# Patient Record
Sex: MALE | Race: WHITE | Employment: OTHER | URBAN - METROPOLITAN AREA
[De-identification: names, ages, dates, MRNs, and addresses within clinical notes are randomized per-mention and may not be internally consistent; named-entity substitution may affect disease eponyms.]

---

## 2017-08-02 ENCOUNTER — HOSPITAL ENCOUNTER (EMERGENCY)
Facility: CLINIC | Age: 67
Discharge: HOME | End: 2017-08-02
Attending: EMERGENCY MEDICINE
Payer: MEDICARE

## 2017-08-02 ENCOUNTER — APPOINTMENT (OUTPATIENT)
Dept: GENERAL RADIOLOGY | Facility: CLINIC | Age: 67
End: 2017-08-02
Payer: MEDICARE

## 2017-08-02 VITALS
HEART RATE: 80 BPM | HEIGHT: 67 IN | DIASTOLIC BLOOD PRESSURE: 98 MMHG | SYSTOLIC BLOOD PRESSURE: 167 MMHG | OXYGEN SATURATION: 98 % | TEMPERATURE: 97.9 F | BODY MASS INDEX: 27.94 KG/M2 | WEIGHT: 178 LBS

## 2017-08-02 DIAGNOSIS — S60.221A CONTUSION OF RIGHT HAND, INITIAL ENCOUNTER: Primary | ICD-10-CM

## 2017-08-02 PROCEDURE — 73130 X-RAY EXAM OF HAND: CPT

## 2017-08-02 PROCEDURE — 99283 EMERGENCY DEPT VISIT LOW MDM: CPT

## 2017-08-02 ASSESSMENT — PAIN SCALES - GENERAL: PAINLEVEL_OUTOF10: 4

## 2017-08-02 ASSESSMENT — ENCOUNTER SYMPTOMS
ABDOMINAL PAIN: 0
COUGH: 0
RHINORRHEA: 0
SHORTNESS OF BREATH: 0
VOMITING: 0
DIARRHEA: 0
COLOR CHANGE: 0
VOICE CHANGE: 0
NAUSEA: 0
BACK PAIN: 0
TROUBLE SWALLOWING: 0

## 2017-08-02 ASSESSMENT — PAIN DESCRIPTION - LOCATION: LOCATION: HAND

## 2017-08-02 ASSESSMENT — PAIN DESCRIPTION - PAIN TYPE: TYPE: ACUTE PAIN

## 2017-08-02 ASSESSMENT — PAIN DESCRIPTION - ORIENTATION: ORIENTATION: RIGHT

## 2018-02-02 ENCOUNTER — HOSPITAL ENCOUNTER (EMERGENCY)
Facility: CLINIC | Age: 68
Discharge: HOME | End: 2018-02-02
Attending: EMERGENCY MEDICINE
Payer: MEDICARE

## 2018-02-02 ENCOUNTER — APPOINTMENT (OUTPATIENT)
Dept: GENERAL RADIOLOGY | Facility: CLINIC | Age: 68
End: 2018-02-02
Payer: MEDICARE

## 2018-02-02 VITALS
OXYGEN SATURATION: 97 % | HEIGHT: 67 IN | BODY MASS INDEX: 27.78 KG/M2 | SYSTOLIC BLOOD PRESSURE: 154 MMHG | HEART RATE: 74 BPM | TEMPERATURE: 97.5 F | WEIGHT: 177 LBS | RESPIRATION RATE: 18 BRPM | DIASTOLIC BLOOD PRESSURE: 83 MMHG

## 2018-02-02 DIAGNOSIS — M79.672 LEFT FOOT PAIN: Primary | ICD-10-CM

## 2018-02-02 PROCEDURE — 99283 EMERGENCY DEPT VISIT LOW MDM: CPT

## 2018-02-02 PROCEDURE — 73630 X-RAY EXAM OF FOOT: CPT

## 2018-02-02 RX ORDER — CEPHALEXIN 500 MG/1
500 CAPSULE ORAL 4 TIMES DAILY
Qty: 28 CAPSULE | Refills: 0 | Status: SHIPPED | OUTPATIENT
Start: 2018-02-02 | End: 2018-02-09

## 2018-02-02 ASSESSMENT — PAIN DESCRIPTION - FREQUENCY: FREQUENCY: CONTINUOUS

## 2018-02-02 ASSESSMENT — PAIN SCALES - GENERAL
PAINLEVEL_OUTOF10: 5
PAINLEVEL_OUTOF10: 5

## 2018-02-02 ASSESSMENT — PAIN DESCRIPTION - PAIN TYPE: TYPE: ACUTE PAIN

## 2018-02-02 ASSESSMENT — PAIN DESCRIPTION - ORIENTATION: ORIENTATION: LEFT

## 2018-02-02 ASSESSMENT — PAIN DESCRIPTION - LOCATION: LOCATION: FOOT

## 2018-02-02 ASSESSMENT — PAIN DESCRIPTION - DESCRIPTORS: DESCRIPTORS: SHARP

## 2018-02-02 NOTE — ED NOTES
Patient arrived to the ED for complaints of L heel pain. States he has been working out more lately and has had this heel pain for about 10 days now. There is a small, dry crack noted on his heel with a scab. (middle of heel) Pulse palpable. Denies any calf pain, numbness/tingling. No redness, swelling or warmth noted. Resting quietly, call light in reach.      Thao Purdy RN  02/02/18 0137

## 2018-02-02 NOTE — ED PROVIDER NOTES
time 02/02/18 11:15:12   Final result by Elizabeth Snow MD (02/02/18 11:15:12)                Impression:    Small plantar calcaneal spur.  No acute osseous abnormality. Narrative:    EXAMINATION:  3 VIEWS OF THE LEFT FOOT    2/2/2018 10:58 am    COMPARISON:  None. HISTORY:  ORDERING SYSTEM PROVIDED HISTORY: pain  TECHNOLOGIST PROVIDED HISTORY:  Reason for exam:->pain  Ordering Physician Provided Reason for Exam: Pt c/o LT heel pain x10 days,  has been working out. Pt denies trauma. Acuity: Acute  Type of Exam: Initial    FINDINGS:  There is no evidence of acute fracture.  There is normal alignment of the  tarsometatarsal joints.  No acute joint abnormality.  No focal osseous  lesion. No acute soft tissue abnormality.  A small plantar calcaneal spur is  present. LABS:  No results found for this visit on 02/02/18. EMERGENCY DEPARTMENT COURSE:   Vitals:    Vitals:    02/02/18 1046 02/02/18 1050   BP: (!) 173/100 (!) 154/83   Pulse: 75    Resp: 16    Temp: 97.5 °F (36.4 °C)    TempSrc: Oral    SpO2: 97%    Weight: 80.3 kg (177 lb)    Height: 5' 7\" (1.702 m)      -------------------------  BP: (!) 154/83, Temp: 97.5 °F (36.4 °C), Pulse: 75, Resp: 16      RE-EVALUATION:  X-ray shows a calcaneal spur, otherwise no other acute process. The patient's tenderness is where he has this dry cracked area on the back of the heel. The cause of the opening in the skin. I will empirically place the patient on Keflex. I'm recommending that he apply antibiotic ointment to the area. Return to the ER for increasing pain, swelling, redness, fever or other concerns otherwise to follow-up with his orthopedic surgeon or family doctor within the next few days  At this time the patient is without objective evidence of an acute process requiring hospitalization or inpatient management.  They have remained hemodynamically stable throughout their entire ED visit and are stable for discharge with outpatient

## 2018-05-12 ENCOUNTER — HOSPITAL ENCOUNTER (EMERGENCY)
Facility: CLINIC | Age: 68
Discharge: HOME | End: 2018-05-12
Attending: EMERGENCY MEDICINE
Payer: MEDICARE

## 2018-05-12 ENCOUNTER — APPOINTMENT (OUTPATIENT)
Dept: GENERAL RADIOLOGY | Facility: CLINIC | Age: 68
End: 2018-05-12
Payer: MEDICARE

## 2018-05-12 VITALS
WEIGHT: 175 LBS | DIASTOLIC BLOOD PRESSURE: 97 MMHG | HEIGHT: 67 IN | HEART RATE: 73 BPM | SYSTOLIC BLOOD PRESSURE: 164 MMHG | OXYGEN SATURATION: 97 % | RESPIRATION RATE: 22 BRPM | BODY MASS INDEX: 27.47 KG/M2 | TEMPERATURE: 99 F

## 2018-05-12 DIAGNOSIS — S93.601A RIGHT FOOT SPRAIN, INITIAL ENCOUNTER: Primary | ICD-10-CM

## 2018-05-12 PROCEDURE — 99283 EMERGENCY DEPT VISIT LOW MDM: CPT

## 2018-05-12 PROCEDURE — 73630 X-RAY EXAM OF FOOT: CPT

## 2018-05-12 ASSESSMENT — PAIN DESCRIPTION - FREQUENCY: FREQUENCY: INTERMITTENT

## 2018-05-12 ASSESSMENT — PAIN DESCRIPTION - LOCATION: LOCATION: FOOT

## 2018-05-12 ASSESSMENT — PAIN SCALES - GENERAL: PAINLEVEL_OUTOF10: 2

## 2018-05-12 ASSESSMENT — PAIN DESCRIPTION - ORIENTATION: ORIENTATION: RIGHT

## 2018-05-12 ASSESSMENT — PAIN DESCRIPTION - DESCRIPTORS: DESCRIPTORS: STABBING

## 2018-07-02 ENCOUNTER — HOSPITAL ENCOUNTER (OUTPATIENT)
Dept: GENERAL RADIOLOGY | Facility: CLINIC | Age: 68
Discharge: HOME | End: 2018-07-04
Payer: MEDICARE

## 2018-07-02 DIAGNOSIS — M54.5 LOW BACK PAIN, UNSPECIFIED BACK PAIN LATERALITY, UNSPECIFIED CHRONICITY, WITH SCIATICA PRESENCE UNSPECIFIED: ICD-10-CM

## 2018-07-02 PROCEDURE — 72100 X-RAY EXAM L-S SPINE 2/3 VWS: CPT

## 2019-10-24 ENCOUNTER — HOSPITAL ENCOUNTER (OUTPATIENT)
Dept: GENERAL RADIOLOGY | Facility: CLINIC | Age: 69
Discharge: HOME | End: 2019-10-26
Payer: MEDICARE

## 2019-10-24 DIAGNOSIS — R05.9 COUGH: ICD-10-CM

## 2019-10-24 PROCEDURE — 71046 X-RAY EXAM CHEST 2 VIEWS: CPT

## 2019-12-16 ENCOUNTER — HOSPITAL ENCOUNTER (OUTPATIENT)
Dept: GENERAL RADIOLOGY | Facility: CLINIC | Age: 69
Discharge: HOME | End: 2019-12-18
Payer: MEDICARE

## 2019-12-16 DIAGNOSIS — R05.9 COUGH: ICD-10-CM

## 2019-12-16 PROCEDURE — 71046 X-RAY EXAM CHEST 2 VIEWS: CPT

## 2019-12-27 ENCOUNTER — HOSPITAL ENCOUNTER (OUTPATIENT)
Dept: GENERAL RADIOLOGY | Facility: CLINIC | Age: 69
Discharge: HOME | End: 2019-12-29
Payer: MEDICARE

## 2019-12-27 DIAGNOSIS — J18.9 PNEUMONIA DUE TO INFECTIOUS ORGANISM, UNSPECIFIED LATERALITY, UNSPECIFIED PART OF LUNG: ICD-10-CM

## 2019-12-27 PROCEDURE — 71046 X-RAY EXAM CHEST 2 VIEWS: CPT

## 2023-09-02 ENCOUNTER — HOSPITAL ENCOUNTER (EMERGENCY)
Facility: CLINIC | Age: 73
Discharge: HOME | End: 2023-09-02
Attending: EMERGENCY MEDICINE
Payer: MEDICARE

## 2023-09-02 VITALS
HEART RATE: 67 BPM | DIASTOLIC BLOOD PRESSURE: 88 MMHG | SYSTOLIC BLOOD PRESSURE: 126 MMHG | TEMPERATURE: 98.6 F | HEIGHT: 66 IN | BODY MASS INDEX: 28.61 KG/M2 | WEIGHT: 178 LBS | RESPIRATION RATE: 17 BRPM | OXYGEN SATURATION: 97 %

## 2023-09-02 DIAGNOSIS — B30.9 ACUTE VIRAL CONJUNCTIVITIS OF BOTH EYES: Primary | ICD-10-CM

## 2023-09-02 PROCEDURE — 99283 EMERGENCY DEPT VISIT LOW MDM: CPT

## 2023-09-02 RX ORDER — POLYMYXIN B SULFATE AND TRIMETHOPRIM 1; 10000 MG/ML; [USP'U]/ML
1 SOLUTION OPHTHALMIC EVERY 6 HOURS
Qty: 2 ML | Refills: 0 | Status: SHIPPED | OUTPATIENT
Start: 2023-09-02 | End: 2023-09-09

## 2023-09-02 ASSESSMENT — ENCOUNTER SYMPTOMS
VOMITING: 0
COUGH: 1
DIARRHEA: 0
EYE DISCHARGE: 1

## 2023-09-02 ASSESSMENT — PAIN - FUNCTIONAL ASSESSMENT: PAIN_FUNCTIONAL_ASSESSMENT: NONE - DENIES PAIN

## 2024-10-01 ENCOUNTER — HOSPITAL ENCOUNTER (OUTPATIENT)
Age: 74
Setting detail: THERAPIES SERIES
Discharge: HOME | End: 2024-10-01
Payer: MEDICARE

## 2024-10-01 PROCEDURE — 97162 PT EVAL MOD COMPLEX 30 MIN: CPT | Performed by: PHYSICAL THERAPIST

## 2024-10-01 PROCEDURE — 97110 THERAPEUTIC EXERCISES: CPT | Performed by: PHYSICAL THERAPIST

## 2024-10-01 NOTE — CONSULTS
[] [facility]  Outpatient Rehabilitation &  Therapy  [address]  [phone]  [phone] [] [facility]  Outpatient Rehabilitation &  Therapy  [address]  P: [phone]  F: [phone] [] [facility]  Outpatient Rehabilitation &  Therapy  [address]  P: [phone]  F: [phone] [] [facility]  Outpatient Rehabilitation &  Therapy  [address]  [phone]  [phone] [] [facility]  Outpatient Rehabilitation &  Therapy  [address]   P: [phone]  F: [phone]  [x] [facility]  Outpatient Rehabilitation &  Therapy  [address]  P: [phone]  F: [phone] [] [facility]  Outpatient Rehabilitation &  Therapy  [address]  P: [phone]  F: [phone] [] [facility]  Outpatient Rehabilitation &  Therapy  [address]  P: [phone]  F: [phone] [] [facility]  Outpatient Rehabilitation &  Therapy  [address]  P: [phone]  F: [phone]  [] [facility] Outpatient Rehabilitation &  Therapy  [address]  P: [phone]  F: [phone]     Physical Therapy General Evaluation    Date:  10/1/2024  Patient: Asad Nicole  : 1950  MRN: [id number]  Physician: Nima Boone MD       Insurance: [employer] MEDICARE-ADVANTAGE PPO (BMN, No Auth)  Medical Diagnosis: S76.012A    Rehab Codes: M25.652, M25.552, M25.572, M25.675  Onset Date: 24                                  Next 's appt:   PRN    Subjective:   Patient reports onset on L anterior hip and groin pain several months ago, without specific incident nor trauma. In addition, he has ongoing pain in the L great toe and dorsum of his foot that has been present for a longer period of time. At presentation today, he

## 2024-10-08 ENCOUNTER — HOSPITAL ENCOUNTER (OUTPATIENT)
Age: 74
Setting detail: THERAPIES SERIES
Discharge: HOME | End: 2024-10-08
Payer: MEDICARE

## 2024-10-08 PROCEDURE — 97035 APP MDLTY 1+ULTRASOUND EA 15: CPT | Performed by: PHYSICAL THERAPIST

## 2024-10-08 PROCEDURE — 97110 THERAPEUTIC EXERCISES: CPT | Performed by: PHYSICAL THERAPIST

## 2024-10-08 NOTE — FLOWSHEET NOTE
[] [facility]  Outpatient Rehabilitation &  Therapy  [address]  [phone]  [phone] [] [facility]  Outpatient Rehabilitation &  Therapy  [address]  P: [phone]  F: [phone] [] [facility]  Outpatient Rehabilitation &  Therapy  [address]  P: [phone]  F: [phone] [] [facility]  Outpatient Rehabilitation &  Therapy  [address]  [phone]  [phone] [] [facility]  Outpatient Rehabilitation &  Therapy  [address]   P: [phone]  F: [phone]  [x] [facility]  Outpatient Rehabilitation &  Therapy  [address]  P: [phone]  F: [phone] [] [facility]  Outpatient Rehabilitation &  Therapy  [address]  P: [phone]  F: [phone] [] [facility]  Outpatient Rehabilitation &  Therapy  [address]  P: [phone]  F: [phone] [] [facility]  Outpatient Rehabilitation &  Therapy  [address]  P: [phone]  F: [phone]  [] [facility] Outpatient Rehabilitation &  Therapy  [address]  P: [phone]  F: [phone]     Physical Therapy Daily Treatment Note    Date:  10/8/2024  Patient Name:  Asad Nicole    :  1950  MRN: [id number]  Physician: Nima Boone MD                                        Insurance: [facility] MEDICARE-ADVANTAGE O (BMN, No Auth)  Medical Diagnosis: S76.012A                        Rehab Codes: M25.652, M25.552, M25.572, M25.675  Onset Date: 24                                  Next 's appt:   PRN     Visit# / total visits: ; Progress note for Medicare patient due at visit 10     Cancels/No Shows: 0/0    Subjective:  Reports compliance with initial HEP. States he is somewhat better however, still irritable with

## 2024-10-10 ENCOUNTER — APPOINTMENT (OUTPATIENT)
Age: 74
End: 2024-10-10
Payer: MEDICARE

## 2024-10-22 ENCOUNTER — HOSPITAL ENCOUNTER (OUTPATIENT)
Age: 74
Setting detail: THERAPIES SERIES
Discharge: HOME | End: 2024-10-22
Payer: MEDICARE

## 2024-10-22 PROCEDURE — 97035 APP MDLTY 1+ULTRASOUND EA 15: CPT | Performed by: PHYSICAL THERAPIST

## 2024-10-22 PROCEDURE — 97110 THERAPEUTIC EXERCISES: CPT | Performed by: PHYSICAL THERAPIST

## 2024-10-22 NOTE — FLOWSHEET NOTE
[] [facility]  Outpatient Rehabilitation &  Therapy  [address]  [phone]  [phone] [] [facility]  Outpatient Rehabilitation &  Therapy  [address]  P: [phone]  F: [phone] [] [facility]  Outpatient Rehabilitation &  Therapy  [address]  P: [phone]  F: [phone] [] [facility]  Outpatient Rehabilitation &  Therapy  [address]  [phone]  [phone] [] [facility]  Outpatient Rehabilitation &  Therapy  [address]   P: [phone]  F: [phone]  [x] [facility]  Outpatient Rehabilitation &  Therapy  [address]  P: [phone]  F: [phone] [] [facility]  Outpatient Rehabilitation &  Therapy  [address]  P: [phone]  F: [phone] [] [facility]  Outpatient Rehabilitation &  Therapy  [address]  P: [phone]  F: [phone] [] [facility]  Outpatient Rehabilitation &  Therapy  [address]  P: [phone]  F: [phone]  [] [facility] Outpatient Rehabilitation &  Therapy  [address]  P: [phone]  F: [phone]     Physical Therapy Daily Treatment Note    Date:  10/22/2024  Patient Name:  Asad Nicole    :  1950  MRN: [id number]  Physician: Nima Boone MD                                        Insurance: [facility] MEDICARE-ADVANTAGE O (BMN, No Auth)  Medical Diagnosis: S76.012A                        Rehab Codes: M25.652, M25.552, M25.572, M25.675  Onset Date: 24                                  Next 's appt:   PRN     Visit# / total visits: 3/12; Progress note for Medicare patient due at visit 10     Cancels/No Shows: 0/0    Subjective: Less foot and toe pain, satisfied with improvement. Less irritability with contact pressure of

## 2024-10-29 ENCOUNTER — HOSPITAL ENCOUNTER (OUTPATIENT)
Age: 74
Setting detail: THERAPIES SERIES
Discharge: HOME | End: 2024-10-29
Payer: MEDICARE

## 2024-10-29 PROCEDURE — 97110 THERAPEUTIC EXERCISES: CPT | Performed by: PHYSICAL THERAPIST

## 2024-10-29 PROCEDURE — 97140 MANUAL THERAPY 1/> REGIONS: CPT | Performed by: PHYSICAL THERAPIST

## 2024-10-29 PROCEDURE — 97035 APP MDLTY 1+ULTRASOUND EA 15: CPT | Performed by: PHYSICAL THERAPIST

## 2024-10-29 NOTE — FLOWSHEET NOTE
[] [facility]  Outpatient Rehabilitation &  Therapy  [address]  [phone]  [phone] [] [facility]  Outpatient Rehabilitation &  Therapy  [address]  P: [phone]  F: [phone] [] [facility]  Outpatient Rehabilitation &  Therapy  [address]  P: [phone]  F: [phone] [] [facility]  Outpatient Rehabilitation &  Therapy  [address]  [phone]  [phone] [] [facility]  Outpatient Rehabilitation &  Therapy  [address]   P: [phone]  F: [phone]  [x] [facility]  Outpatient Rehabilitation &  Therapy  [address]  P: [phone]  F: [phone] [] [facility]  Outpatient Rehabilitation &  Therapy  [address]  P: [phone]  F: [phone] [] [facility]  Outpatient Rehabilitation &  Therapy  [address]  P: [phone]  F: [phone] [] [facility]  Outpatient Rehabilitation &  Therapy  [address]  P: [phone]  F: [phone]  [] [facility] Outpatient Rehabilitation &  Therapy  [address]  P: [phone]  F: [phone]     Physical Therapy Daily Treatment Note    Date:  10/29/2024  Patient Name:  Asad Nicole    :  1950  MRN: [id number]  Physician: Nima Boone MD                                        Insurance: [facility] MEDICARE-ADVANTAGE O (BMN, No Auth)  Medical Diagnosis: S76.012A                        Rehab Codes: M25.652, M25.552, M25.572, M25.675  Onset Date: 24                                  Next 's appt:   PRN     Visit# / total visits: ; Progress note for Medicare patient due at visit 10     Cancels/No Shows: 0/0    Subjective: L foot and great to more manageable, not resolved however. Still bothered by his L knee and is

## 2024-11-05 ENCOUNTER — HOSPITAL ENCOUNTER (OUTPATIENT)
Age: 74
Setting detail: THERAPIES SERIES
Discharge: HOME | End: 2024-11-05
Payer: MEDICARE

## 2024-11-05 PROCEDURE — 97110 THERAPEUTIC EXERCISES: CPT | Performed by: PHYSICAL THERAPIST

## 2024-11-05 PROCEDURE — 97140 MANUAL THERAPY 1/> REGIONS: CPT | Performed by: PHYSICAL THERAPIST

## 2024-11-05 NOTE — FLOWSHEET NOTE
[] [facility]  Outpatient Rehabilitation &  Therapy  [address]  [phone]  [phone] [] [facility]  Outpatient Rehabilitation &  Therapy  [address]  P: [phone]  F: [phone] [] [facility]  Outpatient Rehabilitation &  Therapy  [address]  P: [phone]  F: [phone] [] [facility]  Outpatient Rehabilitation &  Therapy  [address]  [phone]  [phone] [] [facility]  Outpatient Rehabilitation &  Therapy  [address]   P: [phone]  F: [phone]  [x] [facility]  Outpatient Rehabilitation &  Therapy  [address]  P: [phone]  F: [phone] [] [facility]  Outpatient Rehabilitation &  Therapy  [address]  P: [phone]  F: [phone] [] [facility]  Outpatient Rehabilitation &  Therapy  [address]  P: [phone]  F: [phone] [] [facility]  Outpatient Rehabilitation &  Therapy  [address]  P: [phone]  F: [phone]  [] [facility] Outpatient Rehabilitation &  Therapy  [address]  P: [phone]  F: [phone]     Physical Therapy Daily Treatment Note    Date:  2024  Patient Name:  Asad Nicole    :  1950  MRN: [id number]  Physician: Nima Boone MD                                        Insurance: [facility] MEDICARE-ADVANTAGE O (BMN, No Auth)  Medical Diagnosis: S76.012A                        Rehab Codes: M25.652, M25.552, M25.572, M25.675  Onset Date: 24                                  Next 's appt:   PRN     Visit# / total visits: ; Progress note for Medicare patient due at visit 10     Cancels/No Shows: 0/0    Subjective: Reports his L foot and great toe cont to improve now intermittently \"a bother\" rather than pain.

## 2024-11-07 ENCOUNTER — HOSPITAL ENCOUNTER (EMERGENCY)
Facility: CLINIC | Age: 74
Discharge: HOME | End: 2024-11-07
Attending: EMERGENCY MEDICINE
Payer: MEDICARE

## 2024-11-07 VITALS
SYSTOLIC BLOOD PRESSURE: 182 MMHG | HEIGHT: 66 IN | WEIGHT: 180 LBS | HEART RATE: 61 BPM | DIASTOLIC BLOOD PRESSURE: 107 MMHG | RESPIRATION RATE: 15 BRPM | BODY MASS INDEX: 28.93 KG/M2 | TEMPERATURE: 97.9 F | OXYGEN SATURATION: 98 %

## 2024-11-07 DIAGNOSIS — H00.022 HORDEOLUM INTERNUM OF RIGHT LOWER EYELID: Primary | ICD-10-CM

## 2024-11-07 DIAGNOSIS — R03.0 ELEVATED BLOOD PRESSURE READING: ICD-10-CM

## 2024-11-07 PROCEDURE — 99283 EMERGENCY DEPT VISIT LOW MDM: CPT

## 2024-11-07 RX ORDER — ERYTHROMYCIN 5 MG/G
OINTMENT OPHTHALMIC
Qty: 3.5 G | Refills: 0 | Status: SHIPPED | OUTPATIENT
Start: 2024-11-07 | End: 2024-11-17

## 2024-11-07 ASSESSMENT — PAIN DESCRIPTION - ORIENTATION: ORIENTATION: RIGHT

## 2024-11-07 ASSESSMENT — ENCOUNTER SYMPTOMS
EYE DISCHARGE: 0
EYE REDNESS: 1
PHOTOPHOBIA: 0
EYE ITCHING: 0
EYE PAIN: 1
FACIAL SWELLING: 0

## 2024-11-07 ASSESSMENT — PAIN DESCRIPTION - LOCATION: LOCATION: EYE

## 2024-11-07 ASSESSMENT — VISUAL ACUITY: OU: 1

## 2024-11-07 ASSESSMENT — LIFESTYLE VARIABLES
HOW MANY STANDARD DRINKS CONTAINING ALCOHOL DO YOU HAVE ON A TYPICAL DAY: PATIENT DOES NOT DRINK
HOW OFTEN DO YOU HAVE A DRINK CONTAINING ALCOHOL: NEVER

## 2024-11-07 ASSESSMENT — PAIN - FUNCTIONAL ASSESSMENT: PAIN_FUNCTIONAL_ASSESSMENT: 0-10

## 2024-11-07 ASSESSMENT — PAIN SCALES - GENERAL: PAINLEVEL_OUTOF10: 5

## 2024-11-07 ASSESSMENT — PAIN DESCRIPTION - DESCRIPTORS: DESCRIPTORS: DISCOMFORT

## 2024-11-07 NOTE — ED NOTES
Pt had not taken his BP  meds yet today upon arrival. Patient took his lisinopril in the room after he saw his BP.  notified.

## 2024-11-07 NOTE — ED PROVIDER NOTES
[facility]  [address] [address]  Phone: [phone]     Pt Name: Asad Nicole  MRN: [id number]  Birthdate 1950  Date of evaluation: 11/7/24  PCP:  Nima Boone MD    CHIEF COMPLAINT       Chief Complaint   Patient presents with    Eye Pain     R eye redness and pain. Denies any trauma        HISTORY OF PRESENT ILLNESS  (Location/Symptom, Timing/Onset, Context/Setting, Quality, Duration, Modifying Factors, Severity.)    Asad Nicole is a 73 y.o. male who presents with right eye discomfort, watering, redness. Started developing earlier today and has gotten worse. Sees the eye doctor routinely and wears glasses. No hx of glaucoma, macular degeneration, or retinal issues. Doesn't work around metal/wood. No trauma or concern for foreign bodies. No blurry vision or changes in vision.      PAST MEDICAL / SURGICAL / SOCIAL / FAMILY HISTORY    has a past medical history of Depression, Hyperlipidemia, and Hypertension.     has a past surgical history that includes Vasectomy.    Social History     Socioeconomic History    Marital status:      Spouse name: Not on file    Number of children: Not on file    Years of education: Not on file    Highest education level: Not on file   Occupational History    Not on file   Tobacco Use    Smoking status: Never    Smokeless tobacco: Not on file   Substance and Sexual Activity    Alcohol use: No    Drug use: No    Sexual activity: Never   Other Topics Concern    Not on file   Social History Narrative    Not on file     Social Determinants of Health     Financial Resource Strain: Low Risk  (3/26/2021)    [facility]    Overall Financial Resource Strain (CARDIA)     Difficulty of Paying Living Expenses: Not hard at all   Food Insecurity: No Food Insecurity (9/5/2024)    [facility]    Hunger Vital Sign     Worried About Running Out of Food in the

## 2024-11-19 ENCOUNTER — HOSPITAL ENCOUNTER (OUTPATIENT)
Age: 74
Setting detail: THERAPIES SERIES
Discharge: HOME | End: 2024-11-19
Payer: MEDICARE

## 2024-11-19 PROCEDURE — 97110 THERAPEUTIC EXERCISES: CPT | Performed by: PHYSICAL THERAPIST

## 2024-11-19 NOTE — FLOWSHEET NOTE
[] [facility]  Outpatient Rehabilitation &  Therapy  [address]  [phone]  [phone] [] [facility]  Outpatient Rehabilitation &  Therapy  [address]  P: [phone]  F: [phone] [] [facility]  Outpatient Rehabilitation &  Therapy  [address]  P: [phone]  F: [phone] [] [facility]  Outpatient Rehabilitation &  Therapy  [address]  [phone]  [phone] [] [facility]  Outpatient Rehabilitation &  Therapy  [address]   P: [phone]  F: [phone]  [x] [facility]  Outpatient Rehabilitation &  Therapy  [address]  P: [phone]  F: [phone] [] [facility]  Outpatient Rehabilitation &  Therapy  [address]  P: [phone]  F: [phone] [] [facility]  Outpatient Rehabilitation &  Therapy  [address]  P: [phone]  F: [phone] [] [facility]  Outpatient Rehabilitation &  Therapy  [address]  P: [phone]  F: [phone]  [] [facility] Outpatient Rehabilitation &  Therapy  [address]  P: [phone]  F: [phone]     Physical Therapy Daily Treatment Note    Date:  2024  Patient Name:  Asad Nicole    :  1950  MRN: [id number]  Physician: Nima Boone MD                                        Insurance: [facility] MEDICARE-ADVANTAGE O (BMN, No Auth)  Medical Diagnosis: S76.012A                        Rehab Codes: M25.652, M25.552, M25.572, M25.675  Onset Date: 24                                  Next 's appt:   PRN     Visit# / total visits: ; Progress note for Medicare patient due at visit 10     Cancels/No Shows: 0/0    Subjective: States his conditions are consistently resolving and he has been able to return to use of his low

## 2024-11-26 ENCOUNTER — HOSPITAL ENCOUNTER (OUTPATIENT)
Age: 74
Setting detail: THERAPIES SERIES
Discharge: HOME | End: 2024-11-26
Payer: MEDICARE

## 2024-11-26 PROCEDURE — 97110 THERAPEUTIC EXERCISES: CPT | Performed by: PHYSICAL THERAPIST

## 2024-11-26 NOTE — FLOWSHEET NOTE
[] [facility]  Outpatient Rehabilitation &  Therapy  [address]  [phone]  [phone] [] [facility]  Outpatient Rehabilitation &  Therapy  [address]  P: [phone]  F: [phone] [] [facility]  Outpatient Rehabilitation &  Therapy  [address]  P: [phone]  F: [phone] [] [facility]  Outpatient Rehabilitation &  Therapy  [address]  [phone]  [phone] [] [facility]  Outpatient Rehabilitation &  Therapy  [address]   P: [phone]  F: [phone]  [x] [facility]  Outpatient Rehabilitation &  Therapy  [address]  P: [phone]  F: [phone] [] [facility]  Outpatient Rehabilitation &  Therapy  [address]  P: [phone]  F: [phone] [] [facility]  Outpatient Rehabilitation &  Therapy  [address]  P: [phone]  F: [phone] [] [facility]  Outpatient Rehabilitation &  Therapy  [address]  P: [phone]  F: [phone]  [] [facility] Outpatient Rehabilitation &  Therapy  [address]  P: [phone]  F: [phone]     Physical Therapy Daily Treatment Note    Date:  2024  Patient Name:  Asad Nicole    :  1950  MRN: [id number]  Physician: Nima Boone MD                                        Insurance: [facility] MEDICARE-ADVANTAGE O (BMN, No Auth)  Medical Diagnosis: S76.012A                        Rehab Codes: M25.652, M25.552, M25.572, M25.675  Onset Date: 24                                  Next 's appt:   PRN     Visit# / total visits: ; Progress note for Medicare patient due at visit 10    Cancels/No Shows: 0/0    Subjective:  Reports his L knee is fine now and L great toe only is sore. Foot symptoms mainly related to

## 2024-12-03 ENCOUNTER — HOSPITAL ENCOUNTER (OUTPATIENT)
Age: 74
Setting detail: THERAPIES SERIES
Discharge: HOME | End: 2024-12-03

## 2024-12-03 NOTE — FLOWSHEET NOTE
[] [facility]  Outpatient Rehabilitation &  Therapy  [address]  [phone]  [phone] [] [facility]  Outpatient Rehabilitation &  Therapy  [address]  P: [phone]  F: [phone] [] [facility]  Outpatient Rehabilitation &  Therapy  [address]  P: [phone]  F: [phone] [] [facility]  Outpatient Rehabilitation &  Therapy  [address]  [phone]  [phone] [] [facility]  Outpatient Rehabilitation &  Therapy  [address]   P: [phone]  F: [phone]  [] [facility]  Outpatient Rehabilitation &  Therapy  [address]  P: [phone]  F: [phone] [] [facility]  Outpatient Rehabilitation &  Therapy  [address]  P: [phone]  F: [phone] [] [facility]  Outpatient Rehabilitation &  Therapy  [address]  P: [phone]  F: [phone] [] [facility]  Outpatient Rehabilitation &  Therapy  [address]  P: [phone]  F: [phone]  [] [facility] Outpatient Rehabilitation &  Therapy  [address]  P: [phone]  F: [phone]     Therapy Cancel/No Show note    Date: 12/3/2024  Patient: Asad Nicole  : 1950  MRN: [id number]    Cancels/No Shows to date: 0    For today's appointment patient:    [x]  Cancelled    [] Rescheduled appointment    [] No-show     Reason given by patient:    [x]  Patient ill    []  Conflicting appointment    [] No transportation      [] Conflict with work    [] No reason given    [] Weather related    [] COVID-19    [] Other:      Comments:        [x] Next appointment was confirmed    Electronically signed by: LUIS TOSCANO PT

## 2024-12-10 ENCOUNTER — HOSPITAL ENCOUNTER (OUTPATIENT)
Age: 74
Setting detail: THERAPIES SERIES
Discharge: HOME | End: 2024-12-10
Payer: MEDICARE

## 2024-12-10 PROCEDURE — 97110 THERAPEUTIC EXERCISES: CPT | Performed by: PHYSICAL THERAPIST

## 2024-12-10 NOTE — DISCHARGE SUMMARY
[] [facility]  Outpatient Rehabilitation &  Therapy  [address]  [phone]  [phone] [] [facility]  Outpatient Rehabilitation &  Therapy  [address]  P: [phone]  F: [phone] [] [facility]  Outpatient Rehabilitation &  Therapy  [address]  P: [phone]  F: [phone] [] [facility]  Outpatient Rehabilitation &  Therapy  [address]  [phone]  [phone] [] [facility]  Outpatient Rehabilitation &  Therapy  [address]   P: [phone]  F: [phone]  [x] [facility]  Outpatient Rehabilitation &  Therapy  [address]  P: [phone]  F: [phone] [] [facility]  Outpatient Rehabilitation &  Therapy  [address]  P: [phone]  F: [phone] [] [facility]  Outpatient Rehabilitation &  Therapy  [address]  P: [phone]  F: [phone] [] [facility]  Outpatient Rehabilitation &  Therapy  [address]  P: [phone]  F: [phone]  [] [facility] Outpatient Rehabilitation &  Therapy  [address]  P: [phone]  F: [phone]     Physical Therapy Discharge Note    Date: 12/10/2024      Patient: Asad Nicole  : 1950  MRN: [id number]    Physician: Nima Boone MD                                        Insurance: [employer] MEDICARE-ADVANTAGE PPO (BMN, No Auth)  Medical Diagnosis: S76.012A                        Rehab Codes: M25.652, M25.552, M25.572, M25.675  Onset Date: 24                                  Next 's appt:   PRN     Visit# / total visits: ; Progress note for Medicare patient due at visit 10                        Cancels/No Shows:    Date of initial visit:       10/5/24                  Date of final visit:  12/10/24    Subjective:

## 2024-12-10 NOTE — FLOWSHEET NOTE
great toe. Left knee is resolved. Denies limitations in ADL's. Offering complaints of persistent L shoulder pain with most basic use. Attempts to exercise appropriately have resulted in worse pain.   Pain:  [x] Yes  [] No Location: L medial foot and great toe. Pain Rating: (0-10 scale) 1-2/10  Pain altered Tx:  [x] No  [] Yes  Action: N/A     Objective:    (12/10/24) AAROM L MTP ext 55 degrees, flexion 60 degrees,  end range flexion with tightness and firm end feel but, no reactivity. No pain with resisted MTP F/E at 5/5. No noticeable asymmetry in his gait.     Modalities: None  Precautions [x] No  [] Yes:   Exercises:  Exercise Reps/ Time Weight/ Level Comments   AROM great toe F/E   Manual stabilization proximal to LTP   AROM L ankle df   For calf stretch   Standing calf stretch review     Sitting marble pick      Sitting ball roll for arch      Kneeling L hip flexor stretch review     Standing L quad stretch review                                                                                       Treatment Charges: Mins Units   []  Modalities-US     [x]  Ther Exercise 30 2   []  Manual Therapy     []  Ther Activities     []  Neuro Re-ed     []  Vasocompression     [] Gait     []  Other     Total Billable time 30 2       Assessment: [x] Progressing toward goals.    [] No change.     [x] Other: Managing well.  All goals achieved.   [x] Patient would continue to benefit from skilled physical therapy services in order to: restore full ROM L great toe, restore pain free strength to allow return to full WB and amb desires.        STG: (to be met in 6 treatments)  ? Pain: Eliminate pain with L great toe F/E to allow amb and improve efficiency of gait  ? ROM: L great toe F/E = to R without pain to improve efficiency of gait  LTG: (to be met in 12 treatments)  ? Strength: pain free great toe F/E for pain free gait  ? Function: Restore desired WB ADL's  Patient to be independent with home exercise program as